# Patient Record
Sex: MALE | Race: WHITE | Employment: FULL TIME | URBAN - METROPOLITAN AREA
[De-identification: names, ages, dates, MRNs, and addresses within clinical notes are randomized per-mention and may not be internally consistent; named-entity substitution may affect disease eponyms.]

---

## 2020-02-18 ENCOUNTER — OFFICE VISIT (OUTPATIENT)
Dept: ORTHOPEDIC SURGERY | Age: 55
End: 2020-02-18
Payer: COMMERCIAL

## 2020-02-18 VITALS
RESPIRATION RATE: 16 BRPM | DIASTOLIC BLOOD PRESSURE: 87 MMHG | WEIGHT: 235 LBS | HEIGHT: 71 IN | BODY MASS INDEX: 32.9 KG/M2 | SYSTOLIC BLOOD PRESSURE: 131 MMHG | HEART RATE: 77 BPM

## 2020-02-18 PROCEDURE — 99203 OFFICE O/P NEW LOW 30 MIN: CPT | Performed by: ORTHOPAEDIC SURGERY

## 2020-02-18 RX ORDER — ATORVASTATIN CALCIUM 20 MG/1
TABLET, FILM COATED ORAL
COMMUNITY
Start: 2020-01-28

## 2020-02-18 RX ORDER — AMLODIPINE BESYLATE 10 MG/1
TABLET ORAL
COMMUNITY
Start: 2020-02-17

## 2020-02-18 NOTE — PROGRESS NOTES
CHIEF COMPLAINT: Left ankle pain/ Ankle sprain. DATE OF INJURY: 10/2019    HISTORY:  Mr. Osmar Singh 47 y.o.  male presents today for the first visit for evaluation of a left ankle injury which occurred when he was walking and tripped. He is complaining of lateral ankle pain. He went to his PCP because of the pain and was instructed to f/u orthopedics. He was told that he has a sprain, splint was applied and asked to f/u with Orthopedics. He reports today mild pain. Pain increase with walking and decrease with rest and elevation. Rates pain 2/10 VAS. No numbness or tingling sensation, and no other complaint. He has a h/o ankle sprains. He states he feels unstable. No past medical history on file. No past surgical history on file. Social History     Socioeconomic History    Marital status:      Spouse name: Not on file    Number of children: Not on file    Years of education: Not on file    Highest education level: Not on file   Occupational History    Not on file   Social Needs    Financial resource strain: Not on file    Food insecurity:     Worry: Not on file     Inability: Not on file    Transportation needs:     Medical: Not on file     Non-medical: Not on file   Tobacco Use    Smoking status: Never Smoker    Smokeless tobacco: Never Used   Substance and Sexual Activity    Alcohol use:  Yes    Drug use: Never    Sexual activity: Not on file   Lifestyle    Physical activity:     Days per week: Not on file     Minutes per session: Not on file    Stress: Not on file   Relationships    Social connections:     Talks on phone: Not on file     Gets together: Not on file     Attends Buddhist service: Not on file     Active member of club or organization: Not on file     Attends meetings of clubs or organizations: Not on file     Relationship status: Not on file    Intimate partner violence:     Fear of current or ex partner: Not on file     Emotionally abused: Not on file Physically abused: Not on file     Forced sexual activity: Not on file   Other Topics Concern    Not on file   Social History Narrative    Not on file        No family history on file. Pertinent items are noted in HPI  Review of systems reviewed from Patient History Form dated on 2/18/2020 and available in the patient's chart under the Media tab. No change. PHYSICAL EXAM:  Mr. Wilmer Jackson is a very pleasant 47 y.o.  male who presents today in no acute distress, awake, alert, and oriented. He is well dressed, nourished and  groomed. Patient with normal affect. Height is  5' 11\" (1.803 m), weight is 235 lb (106.6 kg). Resting respiratory rate is 16. Examination of the gait, showed that the patient walks with no limp, WB left leg . Examination of both ankles showing a full range of motion of the left ankle compare to the other side because of pain. There is mild swelling that can be seen, no ecchymosis over lateral side of the left ankle. He has intact sensation and good pedal pulses. He has mild tenderness on deep palpation over the left ankle ATF. The left ankle is mildly unstable to drawer test compared to the other side.  Ankle reflex 1+ bilaterally. IMAGING:  Xray's dated today in office,  were reviewed, 3 views of the left ankle, and showed no acute fracture. Ankle mortise in anatomic position. IMPRESSION: Left ankle ATFL sprain with instablity. PLAN:  I assured the patient that the xray is negative for acute fracture. The xray findings were reviewed with the patient and explained to his that the pain is 2ry to ankle sprain, and that it should continue to improve the next 3-4 weeks. He can be WBAT and work on peroneal muscle strength. F/U in 4 weeks, PT if needed. He is aware that if he does not improve he may need surgery.       Chanel Callahan MD

## 2020-02-19 PROBLEM — S93.492A SPRAIN OF ANTERIOR TALOFIBULAR LIGAMENT OF LEFT ANKLE: Status: ACTIVE | Noted: 2020-02-19
